# Patient Record
Sex: MALE | Race: BLACK OR AFRICAN AMERICAN
[De-identification: names, ages, dates, MRNs, and addresses within clinical notes are randomized per-mention and may not be internally consistent; named-entity substitution may affect disease eponyms.]

---

## 2022-12-13 ENCOUNTER — HOSPITAL ENCOUNTER (INPATIENT)
Dept: HOSPITAL 12 - TELE3 | Age: 42
LOS: 6 days | Discharge: HOME | DRG: 871 | End: 2022-12-19
Attending: INTERNAL MEDICINE | Admitting: INTERNAL MEDICINE
Payer: MEDICARE

## 2022-12-13 VITALS — DIASTOLIC BLOOD PRESSURE: 78 MMHG | SYSTOLIC BLOOD PRESSURE: 132 MMHG

## 2022-12-13 VITALS — DIASTOLIC BLOOD PRESSURE: 95 MMHG | SYSTOLIC BLOOD PRESSURE: 152 MMHG

## 2022-12-13 VITALS — SYSTOLIC BLOOD PRESSURE: 152 MMHG | DIASTOLIC BLOOD PRESSURE: 97 MMHG

## 2022-12-13 VITALS — WEIGHT: 125.44 LBS | BODY MASS INDEX: 21.42 KG/M2 | HEIGHT: 64 IN

## 2022-12-13 VITALS — SYSTOLIC BLOOD PRESSURE: 146 MMHG | DIASTOLIC BLOOD PRESSURE: 95 MMHG

## 2022-12-13 VITALS — SYSTOLIC BLOOD PRESSURE: 129 MMHG | DIASTOLIC BLOOD PRESSURE: 89 MMHG

## 2022-12-13 DIAGNOSIS — E03.9: ICD-10-CM
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
## 2022-12-13 DIAGNOSIS — D72.829: ICD-10-CM

## 2022-12-13 DIAGNOSIS — S01.80XA: ICD-10-CM

## 2022-12-13 DIAGNOSIS — J18.9: ICD-10-CM

## 2022-12-13 DIAGNOSIS — Z91.030: ICD-10-CM
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
## 2022-12-13 DIAGNOSIS — C44.320: ICD-10-CM

## 2022-12-13 DIAGNOSIS — J20.9: ICD-10-CM

## 2022-12-13 DIAGNOSIS — Z86.61: ICD-10-CM

## 2022-12-13 DIAGNOSIS — J90: ICD-10-CM

## 2022-12-13 DIAGNOSIS — R91.8: ICD-10-CM

## 2022-12-13 DIAGNOSIS — F41.9: ICD-10-CM

## 2022-12-13 DIAGNOSIS — R74.01: ICD-10-CM

## 2022-12-13 DIAGNOSIS — A41.9: Primary | ICD-10-CM

## 2022-12-13 DIAGNOSIS — I10: ICD-10-CM

## 2022-12-13 DIAGNOSIS — Z92.3: ICD-10-CM

## 2022-12-13 DIAGNOSIS — F29: ICD-10-CM

## 2022-12-13 DIAGNOSIS — E88.09: ICD-10-CM

## 2022-12-13 DIAGNOSIS — J98.11: ICD-10-CM

## 2022-12-13 DIAGNOSIS — G93.49: ICD-10-CM

## 2022-12-13 DIAGNOSIS — E46: ICD-10-CM

## 2022-12-13 DIAGNOSIS — D75.839: ICD-10-CM

## 2022-12-13 DIAGNOSIS — Z88.1: ICD-10-CM

## 2022-12-13 DIAGNOSIS — D64.9: ICD-10-CM

## 2022-12-13 LAB
BUN SERPL-MCNC: 22 MG/DL (ref 7–18)
CHLORIDE SERPL-SCNC: 102 MMOL/L (ref 98–107)
CO2 SERPL-SCNC: 25 MMOL/L (ref 21–32)
CREAT SERPL-MCNC: 0.7 MG/DL (ref 0.6–1.3)
GLUCOSE SERPL-MCNC: 92 MG/DL (ref 74–106)
HCT VFR BLD AUTO: 31.2 % (ref 36.7–47.1)
MCH RBC QN AUTO: 28.6 UUG (ref 23.8–33.4)
MCV RBC AUTO: 86.5 FL (ref 73–96.2)
PLATELET # BLD AUTO: 890 K/UL (ref 152–348)
POTASSIUM SERPL-SCNC: 3.8 MMOL/L (ref 3.5–5.1)

## 2022-12-13 PROCEDURE — A6213 FOAM DRG >16<=48 SQ IN W/BDR: HCPCS

## 2022-12-13 PROCEDURE — A4663 DIALYSIS BLOOD PRESSURE CUFF: HCPCS

## 2022-12-13 PROCEDURE — A6209 FOAM DRSG <=16 SQ IN W/O BDR: HCPCS

## 2022-12-13 PROCEDURE — G0378 HOSPITAL OBSERVATION PER HR: HCPCS

## 2022-12-13 RX ADMIN — ALBUTEROL SULFATE SCH MG: 1.25 SOLUTION RESPIRATORY (INHALATION) at 08:17

## 2022-12-13 RX ADMIN — DEXTROSE SCH MLS/HR: 50 INJECTION, SOLUTION INTRAVENOUS at 23:26

## 2022-12-13 RX ADMIN — DEXTROSE SCH MLS/HR: 50 INJECTION, SOLUTION INTRAVENOUS at 19:05

## 2022-12-13 RX ADMIN — IPRATROPIUM BROMIDE PRN MG: 0.5 SOLUTION RESPIRATORY (INHALATION) at 05:36

## 2022-12-13 RX ADMIN — PENTOXIFYLLINE SCH MG: 400 TABLET, EXTENDED RELEASE ORAL at 16:36

## 2022-12-13 RX ADMIN — ALBUTEROL SULFATE SCH MG: 1.25 SOLUTION RESPIRATORY (INHALATION) at 13:44

## 2022-12-13 RX ADMIN — OXYCODONE HYDROCHLORIDE AND ACETAMINOPHEN SCH MG: 500 TABLET ORAL at 16:04

## 2022-12-13 RX ADMIN — QUETIAPINE SCH MG: 25 TABLET, FILM COATED ORAL at 16:04

## 2022-12-13 RX ADMIN — MUPIROCIN SCH APPLIC: 20 OINTMENT TOPICAL at 20:35

## 2022-12-13 RX ADMIN — CHLORHEXIDINE GLUCONATE SCH ML: 1.2 RINSE ORAL at 16:36

## 2022-12-13 RX ADMIN — OLANZAPINE SCH MG: 5 TABLET ORAL at 20:34

## 2022-12-13 RX ADMIN — GUAIFENESIN AND DEXTROMETHORPHAN PRN ML: 100; 10 SYRUP ORAL at 04:47

## 2022-12-13 RX ADMIN — DEXTROSE SCH MLS/HR: 50 INJECTION, SOLUTION INTRAVENOUS at 19:00

## 2022-12-13 RX ADMIN — ALBUTEROL SULFATE SCH MG: 1.25 SOLUTION RESPIRATORY (INHALATION) at 19:34

## 2022-12-13 NOTE — NUR
ADMITTED THIS 42 Y.O. MALE AS DIRECT ADMIT FROM [facility],VIA GURNEY ,ALERT,ORIENTED 
X2-3, OXYGEN INHALATION AT 4L/MIN VIA NASAL CANNULA SATURATING 90-95%, PT IS AMOUTH 
BREATHER. HE IS NOT INANY DISTRESS.HE HAS HAD HISTORY OF MILD INTELLECTUAL DISABILITY AND 
JUST RECENTLY GOT DISCHARGED FROM [facility] AFTER BEING TREATED FOR COMMUNITY ACQUIRED 
PNA, NEOPLASM OF LOWER LIP.SQUAMOUS CELL CARCINOMA ON LOWER CHIN. SKILLED NURSING ASSESSMENT 
DONE, PHOTO OF LOWER CHIN TAKEN. HISTORY OF HYPOTHYROID,DYSPHAGIA

## 2022-12-13 NOTE — NUR
WOUND CARE CONSULT: PT PRESENTS WITH SACRAL INTACT DEEP TISSUE INJURY AS WELL AS CHIN 
LESIONS, PRESENT ON ADMISSION. SURGICAL CONSULT CALLED TO DR LOPEZ. RECOMMENDATIONS 
MADE FOR SKIN PROTECTION. DISCUSSED WITH NURSING STAFF. CHIN WOUNDS/LESIONS COVERED WITH 
BORDERGAUZE AT THIS TIME. FAMILY MEMBER AT BEDSIDE. MD IN AGREEMENT WITH PLAN OF CARE.

## 2022-12-13 NOTE — NUR
Received patient resting in bed with sister by the bedside. AAOx4. No signs of distress 
noted at this time. On 2L O2 NC. Is SR on tele monitor, HR 98. Right FA IV access is intact 
and patent. Is ambulatory and complains of no pain. Concerns were communicated and resolved 
at this time. Endorsed safety and comfort measures.

## 2022-12-13 NOTE — NUR
Pt. noted to be stable during the shift. Alert and oriented x4. No acute distress noted. 
Able to got to the bathroom with assist. No c/o pain. All need attended and met. Able to 
make the need known. Compliance with the care given. Will continue monitoring the patient.

## 2022-12-13 NOTE — NUR
ATB MAXEFIME GIVEN AS ORDERED AND PT TOLERATED IT WELL,RESTED FAIRLY WELL, WITH OCCASSIONAL 
BOUTS OF NON PRODUCTIVE COUGH, ROBITUSSIN GIVEN.CT CHEST SHOWED LUNG MASS.COVID .NEGATIVE.

## 2022-12-13 NOTE — NUR
IV SITE ON LEFT AC INFILTRATED, NEW IV G20 INSERTED ON RIGHT FOREARMBREATHING TREATMENTS 
ADMINISTERED, ,AUGIE COHEN NP NOTIFIED AND OBTAIED ADMISSION ORDERS, PT ABLE TO SWALLOW 
USING SMALL STRAW.

## 2022-12-14 VITALS — SYSTOLIC BLOOD PRESSURE: 153 MMHG | DIASTOLIC BLOOD PRESSURE: 105 MMHG

## 2022-12-14 VITALS — SYSTOLIC BLOOD PRESSURE: 168 MMHG | DIASTOLIC BLOOD PRESSURE: 105 MMHG

## 2022-12-14 VITALS — DIASTOLIC BLOOD PRESSURE: 93 MMHG | SYSTOLIC BLOOD PRESSURE: 128 MMHG

## 2022-12-14 VITALS — DIASTOLIC BLOOD PRESSURE: 124 MMHG | SYSTOLIC BLOOD PRESSURE: 175 MMHG

## 2022-12-14 VITALS — SYSTOLIC BLOOD PRESSURE: 148 MMHG | DIASTOLIC BLOOD PRESSURE: 92 MMHG

## 2022-12-14 VITALS — DIASTOLIC BLOOD PRESSURE: 101 MMHG | SYSTOLIC BLOOD PRESSURE: 144 MMHG

## 2022-12-14 LAB
ALP SERPL-CCNC: 257 U/L (ref 50–136)
ALT SERPL W/O P-5'-P-CCNC: 180 U/L (ref 16–63)
AST SERPL-CCNC: 161 U/L (ref 15–37)
BILIRUB SERPL-MCNC: 0.5 MG/DL (ref 0.2–1)
BUN SERPL-MCNC: 17 MG/DL (ref 7–18)
CHLORIDE SERPL-SCNC: 101 MMOL/L (ref 98–107)
CO2 SERPL-SCNC: 28 MMOL/L (ref 21–32)
CREAT SERPL-MCNC: 0.8 MG/DL (ref 0.6–1.3)
GLUCOSE SERPL-MCNC: 83 MG/DL (ref 74–106)
HCT VFR BLD AUTO: 35.2 % (ref 36.7–47.1)
IRON SERPL-MCNC: 27 UG/DL (ref 50–175)
LDH SERPL L TO P-CCNC: 175 U/L (ref 85–227)
MAGNESIUM SERPL-MCNC: 2.4 MG/DL (ref 1.8–2.4)
MCH RBC QN AUTO: 28.7 UUG (ref 23.8–33.4)
MCV RBC AUTO: 86.3 FL (ref 73–96.2)
PHOSPHATE SERPL-MCNC: 3.9 MG/DL (ref 2.5–4.9)
PLATELET # BLD AUTO: 973 K/UL (ref 152–348)
POTASSIUM SERPL-SCNC: 3.8 MMOL/L (ref 3.5–5.1)
TSH SERPL DL<=0.005 MIU/L-ACNC: 13.5 MIU/ML (ref 0.36–3.74)
WS STN SPEC: 9.5 G/DL (ref 6.4–8.2)

## 2022-12-14 RX ADMIN — PENTOXIFYLLINE SCH MG: 400 TABLET, EXTENDED RELEASE ORAL at 09:13

## 2022-12-14 RX ADMIN — ALBUTEROL SULFATE SCH MG: 1.25 SOLUTION RESPIRATORY (INHALATION) at 00:38

## 2022-12-14 RX ADMIN — OLANZAPINE SCH MG: 5 TABLET ORAL at 21:03

## 2022-12-14 RX ADMIN — MUPIROCIN SCH APPLIC: 20 OINTMENT TOPICAL at 21:15

## 2022-12-14 RX ADMIN — DEXTROSE SCH MLS/HR: 50 INJECTION, SOLUTION INTRAVENOUS at 13:25

## 2022-12-14 RX ADMIN — OXYCODONE HYDROCHLORIDE AND ACETAMINOPHEN SCH MG: 500 TABLET ORAL at 18:02

## 2022-12-14 RX ADMIN — Medication SCH MG: at 21:14

## 2022-12-14 RX ADMIN — ALBUTEROL SULFATE SCH MG: 1.25 SOLUTION RESPIRATORY (INHALATION) at 14:10

## 2022-12-14 RX ADMIN — ALBUTEROL SULFATE SCH MG: 1.25 SOLUTION RESPIRATORY (INHALATION) at 07:41

## 2022-12-14 RX ADMIN — MUPIROCIN SCH APPLIC: 20 OINTMENT TOPICAL at 09:12

## 2022-12-14 RX ADMIN — DEXTROSE SCH MLS/HR: 50 INJECTION, SOLUTION INTRAVENOUS at 06:32

## 2022-12-14 RX ADMIN — CHLORHEXIDINE GLUCONATE SCH ML: 1.2 RINSE ORAL at 06:02

## 2022-12-14 RX ADMIN — OLANZAPINE SCH MG: 5 TABLET ORAL at 09:13

## 2022-12-14 RX ADMIN — CITALOPRAM HYDROBROMIDE SCH MG: 20 TABLET, FILM COATED ORAL at 09:14

## 2022-12-14 RX ADMIN — CHLORHEXIDINE GLUCONATE SCH ML: 1.2 RINSE ORAL at 21:15

## 2022-12-14 RX ADMIN — OXYCODONE HYDROCHLORIDE AND ACETAMINOPHEN SCH MG: 500 TABLET ORAL at 09:14

## 2022-12-14 RX ADMIN — Medication SCH MG: at 13:24

## 2022-12-14 RX ADMIN — QUETIAPINE SCH MG: 25 TABLET, FILM COATED ORAL at 09:14

## 2022-12-14 RX ADMIN — DEXTROSE SCH MLS/HR: 50 INJECTION, SOLUTION INTRAVENOUS at 21:38

## 2022-12-14 RX ADMIN — CHLORHEXIDINE GLUCONATE SCH ML: 1.2 RINSE ORAL at 18:03

## 2022-12-14 RX ADMIN — PENTOXIFYLLINE SCH MG: 400 TABLET, EXTENDED RELEASE ORAL at 13:23

## 2022-12-14 RX ADMIN — QUETIAPINE SCH MG: 25 TABLET, FILM COATED ORAL at 18:02

## 2022-12-14 RX ADMIN — Medication SCH UNITS: at 09:13

## 2022-12-14 RX ADMIN — ALBUTEROL SULFATE SCH MG: 1.25 SOLUTION RESPIRATORY (INHALATION) at 19:53

## 2022-12-14 RX ADMIN — PENTOXIFYLLINE SCH MG: 400 TABLET, EXTENDED RELEASE ORAL at 18:02

## 2022-12-14 NOTE — NUR
Patient's blood pressure was noted to be elevated 168/105, HR 96, SR on tele monitor. On 2L 
NC, 96% O2 sat. No distress noted at this time. Is able to fall asleep with adequate rest 
comfortably. Denies any pain. Notified Dr. Bingham on current situation. No additional orders 
provided. Will continue to monitor.

## 2022-12-14 NOTE — NUR
Received report from ANDRES Whitehead, NOC shift. Patient is alert and oriented x3-4, and understands 
English. Upon speaking, patient is unable to communicate clearly, but able to make needs 
known via garble speech and arm gestures. 



Upon morning vital signs, RN noted high blood pressure 153/105. RN notified Dr. Obey MD 
regarding the elevation of blood pressure consistent with the night shift from report. MD 
ordered Metoprol 25mg PO Q12H for blood pressure. Upon administration, patient's blood 
pressure for 1600 had reduce to 128/93. Other vital signs within normal limits of baseline. 
Per report, patient had been on 1L NC. RN noted without NC, patient is saturating 98% on RA. 
Patient reports no SOB. 



Patient tolerates PO medications and puree diet well. IV antibiotics given as ordered. 
Patient voids adequate. Tele monitor indicates sinus rhythm. 



Patient seen ambulating around the unit with standby assist. Patient is steady. Patient 
expressed he enjoyed the walk to stretch his legs. Throughout the day, RN changed chin 
dressing x3 due to soiling. RN followed MD orders for wound treatment as indicated. Dressing 
is clean, dry and intact. Universal precautions observed. Patient has family member, Jamari, 
at bedside. No acute distress noted. Endorsed continuation of care to ANDRES Whitehead.

## 2022-12-14 NOTE — NUR
Received patient laying in bed comfortably with the sister and dad by bedside. AAOx4. No 
complaints of pain, SOB at this time. Is SR on tele monitor, HR 87. Right FA IV access is 
intact and patent. Safety and comfort measures are enforced. All concerns were addressed and 
were verbally understood by family members.

## 2022-12-14 NOTE — NUR
Patient slept intermittently throughout the night. No reaction to antibiotics were noted. No 
signs of distress or complains of pain. Is SR on tele monitor, HR 96. Blood pressure is 
still elevated, will endorse. All needs were attended to and met. Maintained safety and 
comfort measures.

## 2022-12-15 VITALS — DIASTOLIC BLOOD PRESSURE: 85 MMHG | SYSTOLIC BLOOD PRESSURE: 125 MMHG

## 2022-12-15 VITALS — DIASTOLIC BLOOD PRESSURE: 81 MMHG | SYSTOLIC BLOOD PRESSURE: 131 MMHG

## 2022-12-15 VITALS — DIASTOLIC BLOOD PRESSURE: 89 MMHG | SYSTOLIC BLOOD PRESSURE: 124 MMHG

## 2022-12-15 VITALS — DIASTOLIC BLOOD PRESSURE: 102 MMHG | SYSTOLIC BLOOD PRESSURE: 148 MMHG

## 2022-12-15 VITALS — SYSTOLIC BLOOD PRESSURE: 141 MMHG | DIASTOLIC BLOOD PRESSURE: 97 MMHG

## 2022-12-15 LAB
ALP SERPL-CCNC: 233 U/L (ref 50–136)
ALT SERPL W/O P-5'-P-CCNC: 144 U/L (ref 16–63)
AST SERPL-CCNC: 103 U/L (ref 15–37)
BASE EXCESS BLDA CALC-SCNC: 2.9 MMOL/L
BILIRUB SERPL-MCNC: 0.3 MG/DL (ref 0.2–1)
BUN SERPL-MCNC: 23 MG/DL (ref 7–18)
CHLORIDE SERPL-SCNC: 103 MMOL/L (ref 98–107)
CO2 SERPL-SCNC: 28 MMOL/L (ref 21–32)
CREAT SERPL-MCNC: 0.8 MG/DL (ref 0.6–1.3)
GLUCOSE SERPL-MCNC: 95 MG/DL (ref 74–106)
HCO3 BLDA-SCNC: 26.5 MMOL/L
HCT VFR BLD AUTO: 35.2 % (ref 36.7–47.1)
HGB BLDA OXIMETRY-MCNC: 13 G/DL (ref 13.5–18)
INHALED O2 CONCENTRATION: 21 %
MAGNESIUM SERPL-MCNC: 2.4 MG/DL (ref 1.8–2.4)
MCH RBC QN AUTO: 28.4 UUG (ref 23.8–33.4)
MCV RBC AUTO: 86.6 FL (ref 73–96.2)
PCO2 TEMP ADJ BLDA: 37.4 MMHG (ref 35–45)
PH TEMP ADJ BLDA: 7.47 [PH] (ref 7.35–7.45)
PHOSPHATE SERPL-MCNC: 4.2 MG/DL (ref 2.5–4.9)
PLATELET # BLD AUTO: 983 K/UL (ref 152–348)
PO2 TEMP ADJ BLDA: 71.5 MMHG (ref 75–100)
POTASSIUM SERPL-SCNC: 4.4 MMOL/L (ref 3.5–5.1)
SPECIMEN DRAWN FROM PATIENT: (no result)
VENTILATION MODE VENT: (no result)
WS STN SPEC: 9.5 G/DL (ref 6.4–8.2)

## 2022-12-15 RX ADMIN — CHLORHEXIDINE GLUCONATE SCH ML: 1.2 RINSE ORAL at 21:26

## 2022-12-15 RX ADMIN — OXYCODONE HYDROCHLORIDE AND ACETAMINOPHEN SCH MG: 500 TABLET ORAL at 09:32

## 2022-12-15 RX ADMIN — ALBUTEROL SULFATE SCH MG: 1.25 SOLUTION RESPIRATORY (INHALATION) at 01:07

## 2022-12-15 RX ADMIN — Medication SCH MG: at 21:42

## 2022-12-15 RX ADMIN — MUPIROCIN SCH APPLIC: 20 OINTMENT TOPICAL at 09:44

## 2022-12-15 RX ADMIN — ASPIRIN SCH MG: 81 TABLET, COATED ORAL at 15:51

## 2022-12-15 RX ADMIN — PENTOXIFYLLINE SCH MG: 400 TABLET, EXTENDED RELEASE ORAL at 13:18

## 2022-12-15 RX ADMIN — ALBUTEROL SULFATE SCH MG: 1.25 SOLUTION RESPIRATORY (INHALATION) at 15:05

## 2022-12-15 RX ADMIN — QUETIAPINE SCH MG: 25 TABLET, FILM COATED ORAL at 16:24

## 2022-12-15 RX ADMIN — DEXTROSE SCH MLS/HR: 50 INJECTION, SOLUTION INTRAVENOUS at 06:09

## 2022-12-15 RX ADMIN — LEVOTHYROXINE SODIUM SCH MCG: 100 TABLET ORAL at 06:09

## 2022-12-15 RX ADMIN — Medication SCH MG: at 09:38

## 2022-12-15 RX ADMIN — CHLORHEXIDINE GLUCONATE SCH ML: 1.2 RINSE ORAL at 09:43

## 2022-12-15 RX ADMIN — PENTOXIFYLLINE SCH MG: 400 TABLET, EXTENDED RELEASE ORAL at 16:24

## 2022-12-15 RX ADMIN — DEXTROSE SCH MLS/HR: 50 INJECTION, SOLUTION INTRAVENOUS at 14:36

## 2022-12-15 RX ADMIN — GUAIFENESIN AND DEXTROMETHORPHAN PRN ML: 100; 10 SYRUP ORAL at 22:26

## 2022-12-15 RX ADMIN — DEXTROSE SCH MLS/HR: 50 INJECTION, SOLUTION INTRAVENOUS at 21:37

## 2022-12-15 RX ADMIN — OLANZAPINE SCH MG: 5 TABLET ORAL at 09:32

## 2022-12-15 RX ADMIN — PENTOXIFYLLINE SCH MG: 400 TABLET, EXTENDED RELEASE ORAL at 09:43

## 2022-12-15 RX ADMIN — OXYCODONE HYDROCHLORIDE AND ACETAMINOPHEN SCH MG: 500 TABLET ORAL at 16:24

## 2022-12-15 RX ADMIN — Medication SCH UNITS: at 09:44

## 2022-12-15 RX ADMIN — MUPIROCIN SCH APPLIC: 20 OINTMENT TOPICAL at 21:37

## 2022-12-15 RX ADMIN — CITALOPRAM HYDROBROMIDE SCH MG: 20 TABLET, FILM COATED ORAL at 09:46

## 2022-12-15 RX ADMIN — ALBUTEROL SULFATE SCH MG: 1.25 SOLUTION RESPIRATORY (INHALATION) at 08:50

## 2022-12-15 RX ADMIN — QUETIAPINE SCH MG: 25 TABLET, FILM COATED ORAL at 09:33

## 2022-12-15 RX ADMIN — ALBUTEROL SULFATE SCH MG: 1.25 SOLUTION RESPIRATORY (INHALATION) at 20:56

## 2022-12-15 RX ADMIN — OLANZAPINE SCH MG: 5 TABLET ORAL at 21:42

## 2022-12-15 NOTE — NUR
Patient slept comfortably throughout the night. No complaints of pain or SOB was noted at 
this time. SR on tele monitor, HR 83. No adverse reaction was noted for administered 
antibiotics. Safety and comfort measures were maintained. All needs were attended to and 
met.

## 2022-12-16 VITALS — DIASTOLIC BLOOD PRESSURE: 101 MMHG | SYSTOLIC BLOOD PRESSURE: 175 MMHG

## 2022-12-16 VITALS — SYSTOLIC BLOOD PRESSURE: 130 MMHG | DIASTOLIC BLOOD PRESSURE: 93 MMHG

## 2022-12-16 VITALS — SYSTOLIC BLOOD PRESSURE: 109 MMHG | DIASTOLIC BLOOD PRESSURE: 77 MMHG

## 2022-12-16 VITALS — DIASTOLIC BLOOD PRESSURE: 72 MMHG | SYSTOLIC BLOOD PRESSURE: 103 MMHG

## 2022-12-16 RX ADMIN — QUETIAPINE SCH MG: 25 TABLET, FILM COATED ORAL at 16:40

## 2022-12-16 RX ADMIN — CHLORHEXIDINE GLUCONATE SCH ML: 1.2 RINSE ORAL at 09:30

## 2022-12-16 RX ADMIN — CHLORHEXIDINE GLUCONATE SCH ML: 1.2 RINSE ORAL at 21:13

## 2022-12-16 RX ADMIN — PENTOXIFYLLINE SCH MG: 400 TABLET, EXTENDED RELEASE ORAL at 16:41

## 2022-12-16 RX ADMIN — OXYCODONE HYDROCHLORIDE AND ACETAMINOPHEN SCH MG: 500 TABLET ORAL at 09:31

## 2022-12-16 RX ADMIN — MUPIROCIN SCH APPLIC: 20 OINTMENT TOPICAL at 09:31

## 2022-12-16 RX ADMIN — Medication SCH MG: at 21:13

## 2022-12-16 RX ADMIN — ALBUTEROL SULFATE SCH MG: 1.25 SOLUTION RESPIRATORY (INHALATION) at 00:30

## 2022-12-16 RX ADMIN — DEXTROSE SCH MLS/HR: 50 INJECTION, SOLUTION INTRAVENOUS at 13:38

## 2022-12-16 RX ADMIN — DEXTROSE SCH MLS/HR: 50 INJECTION, SOLUTION INTRAVENOUS at 21:11

## 2022-12-16 RX ADMIN — GUAIFENESIN AND DEXTROMETHORPHAN PRN ML: 100; 10 SYRUP ORAL at 21:11

## 2022-12-16 RX ADMIN — PENTOXIFYLLINE SCH MG: 400 TABLET, EXTENDED RELEASE ORAL at 13:16

## 2022-12-16 RX ADMIN — ALBUTEROL SULFATE SCH MG: 1.25 SOLUTION RESPIRATORY (INHALATION) at 20:20

## 2022-12-16 RX ADMIN — ALBUTEROL SULFATE SCH MG: 1.25 SOLUTION RESPIRATORY (INHALATION) at 08:42

## 2022-12-16 RX ADMIN — OLANZAPINE SCH MG: 5 TABLET ORAL at 09:32

## 2022-12-16 RX ADMIN — SODIUM CHLORIDE PRN MLS/HR: 0.9 INJECTION, SOLUTION INTRAVENOUS at 02:13

## 2022-12-16 RX ADMIN — LEVOTHYROXINE SODIUM SCH MCG: 100 TABLET ORAL at 06:04

## 2022-12-16 RX ADMIN — ASPIRIN SCH MG: 81 TABLET, COATED ORAL at 09:31

## 2022-12-16 RX ADMIN — Medication SCH MG: at 10:16

## 2022-12-16 RX ADMIN — DEXTROSE SCH MLS/HR: 50 INJECTION, SOLUTION INTRAVENOUS at 05:28

## 2022-12-16 RX ADMIN — ALBUTEROL SULFATE SCH MG: 1.25 SOLUTION RESPIRATORY (INHALATION) at 13:46

## 2022-12-16 RX ADMIN — PENTOXIFYLLINE SCH MG: 400 TABLET, EXTENDED RELEASE ORAL at 09:31

## 2022-12-16 RX ADMIN — MUPIROCIN SCH APPLIC: 20 OINTMENT TOPICAL at 21:17

## 2022-12-16 RX ADMIN — CITALOPRAM HYDROBROMIDE SCH MG: 20 TABLET, FILM COATED ORAL at 09:31

## 2022-12-16 RX ADMIN — QUETIAPINE SCH MG: 25 TABLET, FILM COATED ORAL at 09:32

## 2022-12-16 RX ADMIN — Medication SCH UNITS: at 09:30

## 2022-12-16 RX ADMIN — OLANZAPINE SCH MG: 5 TABLET ORAL at 21:12

## 2022-12-16 RX ADMIN — OXYCODONE HYDROCHLORIDE AND ACETAMINOPHEN SCH MG: 500 TABLET ORAL at 16:41

## 2022-12-16 NOTE — NUR
Patient has been NPO since midnight, he just got picked up for CT scan of chest, abdomen and 
pelvis by radiology.Consent form signed by the patient. His father is at bedside, went 
downstairs with them.

## 2022-12-16 NOTE — NUR
Pt stable. Meddto MR reBP elevated no prn med. Ambulate to BR [facility] assistance. 1:1 Sitter at 
bedside.

## 2022-12-17 VITALS — SYSTOLIC BLOOD PRESSURE: 154 MMHG | DIASTOLIC BLOOD PRESSURE: 94 MMHG

## 2022-12-17 VITALS — SYSTOLIC BLOOD PRESSURE: 110 MMHG | DIASTOLIC BLOOD PRESSURE: 78 MMHG

## 2022-12-17 LAB
HCT VFR BLD AUTO: 35.9 % (ref 36.7–47.1)
IGG SERPL-MCNC: 3067 MG/DL (ref 603–1613)
MCH RBC QN AUTO: 28.3 UUG (ref 23.8–33.4)
MCV RBC AUTO: 86.4 FL (ref 73–96.2)
PLATELET # BLD AUTO: 836 K/UL (ref 152–348)

## 2022-12-17 RX ADMIN — OXYCODONE HYDROCHLORIDE AND ACETAMINOPHEN SCH MG: 500 TABLET ORAL at 08:20

## 2022-12-17 RX ADMIN — MUPIROCIN SCH APPLIC: 20 OINTMENT TOPICAL at 21:00

## 2022-12-17 RX ADMIN — CHLORHEXIDINE GLUCONATE SCH ML: 1.2 RINSE ORAL at 08:19

## 2022-12-17 RX ADMIN — ASPIRIN SCH MG: 81 TABLET, COATED ORAL at 08:20

## 2022-12-17 RX ADMIN — LEVOTHYROXINE SODIUM SCH MCG: 100 TABLET ORAL at 06:02

## 2022-12-17 RX ADMIN — DEXTROSE SCH MLS/HR: 50 INJECTION, SOLUTION INTRAVENOUS at 05:26

## 2022-12-17 RX ADMIN — CITALOPRAM HYDROBROMIDE SCH MG: 20 TABLET, FILM COATED ORAL at 08:20

## 2022-12-17 RX ADMIN — DEXTROSE SCH MLS/HR: 50 INJECTION, SOLUTION INTRAVENOUS at 15:14

## 2022-12-17 RX ADMIN — ALBUTEROL SULFATE SCH MG: 1.25 SOLUTION RESPIRATORY (INHALATION) at 08:45

## 2022-12-17 RX ADMIN — Medication SCH UNITS: at 08:20

## 2022-12-17 RX ADMIN — ALBUTEROL SULFATE SCH MG: 1.25 SOLUTION RESPIRATORY (INHALATION) at 14:20

## 2022-12-17 RX ADMIN — GUAIFENESIN AND DEXTROMETHORPHAN PRN ML: 100; 10 SYRUP ORAL at 21:02

## 2022-12-17 RX ADMIN — ALBUTEROL SULFATE SCH MG: 1.25 SOLUTION RESPIRATORY (INHALATION) at 01:30

## 2022-12-17 RX ADMIN — Medication SCH MG: at 21:02

## 2022-12-17 RX ADMIN — PENTOXIFYLLINE SCH MG: 400 TABLET, EXTENDED RELEASE ORAL at 15:14

## 2022-12-17 RX ADMIN — Medication SCH MG: at 08:33

## 2022-12-17 RX ADMIN — OXYCODONE HYDROCHLORIDE AND ACETAMINOPHEN SCH MG: 500 TABLET ORAL at 17:06

## 2022-12-17 RX ADMIN — ALBUTEROL SULFATE SCH MG: 1.25 SOLUTION RESPIRATORY (INHALATION) at 18:46

## 2022-12-17 RX ADMIN — PENTOXIFYLLINE SCH MG: 400 TABLET, EXTENDED RELEASE ORAL at 17:06

## 2022-12-17 RX ADMIN — Medication SCH MG: at 08:34

## 2022-12-17 RX ADMIN — PENTOXIFYLLINE SCH MG: 400 TABLET, EXTENDED RELEASE ORAL at 08:20

## 2022-12-17 RX ADMIN — CHLORHEXIDINE GLUCONATE SCH ML: 1.2 RINSE ORAL at 21:06

## 2022-12-17 RX ADMIN — SODIUM CHLORIDE PRN MLS/HR: 0.9 INJECTION, SOLUTION INTRAVENOUS at 06:09

## 2022-12-17 RX ADMIN — DEXTROSE SCH MLS/HR: 50 INJECTION, SOLUTION INTRAVENOUS at 22:01

## 2022-12-17 RX ADMIN — OLANZAPINE SCH MG: 5 TABLET ORAL at 21:03

## 2022-12-17 RX ADMIN — OLANZAPINE SCH MG: 5 TABLET ORAL at 08:20

## 2022-12-17 RX ADMIN — QUETIAPINE SCH MG: 25 TABLET, FILM COATED ORAL at 08:20

## 2022-12-17 RX ADMIN — MUPIROCIN SCH APPLIC: 20 OINTMENT TOPICAL at 08:19

## 2022-12-17 RX ADMIN — QUETIAPINE SCH MG: 25 TABLET, FILM COATED ORAL at 17:06

## 2022-12-17 NOTE — NUR
Patient requested to take a shower. His father at bedside. His father assisted him with the 
shower. Patient appears happy.

## 2022-12-18 VITALS — SYSTOLIC BLOOD PRESSURE: 130 MMHG | DIASTOLIC BLOOD PRESSURE: 89 MMHG

## 2022-12-18 VITALS — DIASTOLIC BLOOD PRESSURE: 83 MMHG | SYSTOLIC BLOOD PRESSURE: 114 MMHG

## 2022-12-18 VITALS — DIASTOLIC BLOOD PRESSURE: 67 MMHG | SYSTOLIC BLOOD PRESSURE: 101 MMHG

## 2022-12-18 LAB
ALP SERPL-CCNC: 181 U/L (ref 50–136)
ALT SERPL W/O P-5'-P-CCNC: 125 U/L (ref 16–63)
AST SERPL-CCNC: 76 U/L (ref 15–37)
BILIRUB SERPL-MCNC: 0.5 MG/DL (ref 0.2–1)
BUN SERPL-MCNC: 18 MG/DL (ref 7–18)
CHLORIDE SERPL-SCNC: 100 MMOL/L (ref 98–107)
CO2 SERPL-SCNC: 30 MMOL/L (ref 21–32)
CREAT SERPL-MCNC: 0.7 MG/DL (ref 0.6–1.3)
GLUCOSE SERPL-MCNC: 91 MG/DL (ref 74–106)
HCT VFR BLD AUTO: 38 % (ref 36.7–47.1)
MAGNESIUM SERPL-MCNC: 2.4 MG/DL (ref 1.8–2.4)
MCH RBC QN AUTO: 27.9 UUG (ref 23.8–33.4)
MCV RBC AUTO: 86.5 FL (ref 73–96.2)
PHOSPHATE SERPL-MCNC: 3.4 MG/DL (ref 2.5–4.9)
PLATELET # BLD AUTO: 850 K/UL (ref 152–348)
POTASSIUM SERPL-SCNC: 4.1 MMOL/L (ref 3.5–5.1)
WS STN SPEC: 9.3 G/DL (ref 6.4–8.2)

## 2022-12-18 RX ADMIN — DEXTROSE SCH MLS/HR: 50 INJECTION, SOLUTION INTRAVENOUS at 22:00

## 2022-12-18 RX ADMIN — ALBUTEROL SULFATE SCH MG: 1.25 SOLUTION RESPIRATORY (INHALATION) at 00:22

## 2022-12-18 RX ADMIN — Medication SCH UNITS: at 08:12

## 2022-12-18 RX ADMIN — CHLORHEXIDINE GLUCONATE SCH ML: 1.2 RINSE ORAL at 08:12

## 2022-12-18 RX ADMIN — ALBUTEROL SULFATE SCH MG: 1.25 SOLUTION RESPIRATORY (INHALATION) at 09:14

## 2022-12-18 RX ADMIN — Medication SCH MG: at 08:12

## 2022-12-18 RX ADMIN — QUETIAPINE SCH MG: 25 TABLET, FILM COATED ORAL at 08:12

## 2022-12-18 RX ADMIN — Medication SCH MG: at 20:49

## 2022-12-18 RX ADMIN — IPRATROPIUM BROMIDE PRN MG: 0.5 SOLUTION RESPIRATORY (INHALATION) at 09:15

## 2022-12-18 RX ADMIN — PENTOXIFYLLINE SCH MG: 400 TABLET, EXTENDED RELEASE ORAL at 10:11

## 2022-12-18 RX ADMIN — CHLORHEXIDINE GLUCONATE SCH ML: 1.2 RINSE ORAL at 21:00

## 2022-12-18 RX ADMIN — GUAIFENESIN AND DEXTROMETHORPHAN PRN ML: 100; 10 SYRUP ORAL at 09:02

## 2022-12-18 RX ADMIN — PENTOXIFYLLINE SCH MG: 400 TABLET, EXTENDED RELEASE ORAL at 16:49

## 2022-12-18 RX ADMIN — ALBUTEROL SULFATE SCH MG: 1.25 SOLUTION RESPIRATORY (INHALATION) at 14:05

## 2022-12-18 RX ADMIN — OXYCODONE HYDROCHLORIDE AND ACETAMINOPHEN SCH MG: 500 TABLET ORAL at 08:12

## 2022-12-18 RX ADMIN — QUETIAPINE SCH MG: 25 TABLET, FILM COATED ORAL at 16:49

## 2022-12-18 RX ADMIN — OXYCODONE HYDROCHLORIDE AND ACETAMINOPHEN SCH MG: 500 TABLET ORAL at 16:49

## 2022-12-18 RX ADMIN — OLANZAPINE SCH MG: 5 TABLET ORAL at 20:54

## 2022-12-18 RX ADMIN — DEXTROSE SCH MLS/HR: 50 INJECTION, SOLUTION INTRAVENOUS at 06:00

## 2022-12-18 RX ADMIN — CITALOPRAM HYDROBROMIDE SCH MG: 20 TABLET, FILM COATED ORAL at 08:12

## 2022-12-18 RX ADMIN — PENTOXIFYLLINE SCH MG: 400 TABLET, EXTENDED RELEASE ORAL at 12:46

## 2022-12-18 RX ADMIN — LEVOTHYROXINE SODIUM SCH MCG: 100 TABLET ORAL at 06:00

## 2022-12-18 RX ADMIN — MUPIROCIN SCH APPLIC: 20 OINTMENT TOPICAL at 20:47

## 2022-12-18 RX ADMIN — OLANZAPINE SCH MG: 5 TABLET ORAL at 08:12

## 2022-12-18 RX ADMIN — MUPIROCIN SCH APPLIC: 20 OINTMENT TOPICAL at 10:49

## 2022-12-18 RX ADMIN — ASPIRIN SCH MG: 81 TABLET, COATED ORAL at 08:12

## 2022-12-18 RX ADMIN — ALBUTEROL SULFATE SCH MG: 1.25 SOLUTION RESPIRATORY (INHALATION) at 21:00

## 2022-12-19 VITALS — SYSTOLIC BLOOD PRESSURE: 105 MMHG | DIASTOLIC BLOOD PRESSURE: 80 MMHG

## 2022-12-19 VITALS — SYSTOLIC BLOOD PRESSURE: 105 MMHG | DIASTOLIC BLOOD PRESSURE: 75 MMHG

## 2022-12-19 VITALS — SYSTOLIC BLOOD PRESSURE: 133 MMHG | DIASTOLIC BLOOD PRESSURE: 95 MMHG

## 2022-12-19 LAB
ALBUMIN SERPL ELPH-MCNC: 3 G/DL (ref 2.9–4.4)
ALBUMIN/GLOB SERPL: 0.5 {RATIO} (ref 0.7–1.7)
ALPHA1 GLOB SERPL ELPH-MCNC: 0.4 G/DL (ref 0–0.4)
ALPHA2 GLOB SERPL ELPH-MCNC: 1.1 G/DL (ref 0.4–1)
B-GLOBULIN SERPL ELPH-MCNC: 1.3 G/DL (ref 0.7–1.3)
BUN SERPL-MCNC: 19 MG/DL (ref 7–18)
CHLORIDE SERPL-SCNC: 99 MMOL/L (ref 98–107)
CO2 SERPL-SCNC: 32 MMOL/L (ref 21–32)
CREAT SERPL-MCNC: 0.8 MG/DL (ref 0.6–1.3)
GAMMA GLOB SERPL ELPH-MCNC: 2.9 G/DL (ref 0.4–1.8)
GLOBULIN SER CALC-MCNC: 5.7 G/DL (ref 2.2–3.9)
GLUCOSE SERPL-MCNC: 95 MG/DL (ref 74–106)
HCT VFR BLD AUTO: 36.2 % (ref 36.7–47.1)
MCH RBC QN AUTO: 28.4 UUG (ref 23.8–33.4)
MCV RBC AUTO: 86.8 FL (ref 73–96.2)
PLATELET # BLD AUTO: 765 K/UL (ref 152–348)
POTASSIUM SERPL-SCNC: 4 MMOL/L (ref 3.5–5.1)

## 2022-12-19 RX ADMIN — CITALOPRAM HYDROBROMIDE SCH MG: 20 TABLET, FILM COATED ORAL at 08:20

## 2022-12-19 RX ADMIN — DEXTROSE SCH MLS/HR: 50 INJECTION, SOLUTION INTRAVENOUS at 06:00

## 2022-12-19 RX ADMIN — Medication SCH MG: at 08:20

## 2022-12-19 RX ADMIN — OXYCODONE HYDROCHLORIDE AND ACETAMINOPHEN SCH MG: 500 TABLET ORAL at 08:20

## 2022-12-19 RX ADMIN — QUETIAPINE SCH MG: 25 TABLET, FILM COATED ORAL at 08:20

## 2022-12-19 RX ADMIN — PENTOXIFYLLINE SCH MG: 400 TABLET, EXTENDED RELEASE ORAL at 13:23

## 2022-12-19 RX ADMIN — ALBUTEROL SULFATE SCH MG: 1.25 SOLUTION RESPIRATORY (INHALATION) at 07:35

## 2022-12-19 RX ADMIN — LEVOTHYROXINE SODIUM SCH MCG: 100 TABLET ORAL at 06:27

## 2022-12-19 RX ADMIN — Medication SCH UNITS: at 08:20

## 2022-12-19 RX ADMIN — ALBUTEROL SULFATE SCH MG: 1.25 SOLUTION RESPIRATORY (INHALATION) at 01:03

## 2022-12-19 RX ADMIN — PENTOXIFYLLINE SCH MG: 400 TABLET, EXTENDED RELEASE ORAL at 08:20

## 2022-12-19 RX ADMIN — ALBUTEROL SULFATE SCH MG: 1.25 SOLUTION RESPIRATORY (INHALATION) at 12:49

## 2022-12-19 RX ADMIN — OLANZAPINE SCH MG: 5 TABLET ORAL at 08:20

## 2022-12-19 RX ADMIN — MUPIROCIN SCH APPLIC: 20 OINTMENT TOPICAL at 09:02

## 2022-12-19 RX ADMIN — CHLORHEXIDINE GLUCONATE SCH ML: 1.2 RINSE ORAL at 09:02

## 2022-12-19 NOTE — NUR
dc orders received noted and carried out.dc instruction and education given to pt father 
.fax the dc paper to group home.pt left the facility via private car in stable condition

## 2022-12-19 NOTE — NUR
pt with sitter at bedside iv out attempted to restart by charge nurse and additional nurse 
on the unit but patient ran around in corridor and refused to have iiv reinserted. Teaching 
done as to the importance of having the essence reinserted but he was adamant that he did 
not want it done. infusion held until patient is compliant.

## 2022-12-20 LAB — IGM SERPL-MCNC: 97 MG/DL (ref 20–172)

## 2023-03-29 ENCOUNTER — HOSPITAL ENCOUNTER (INPATIENT)
Dept: HOSPITAL 54 - TELE | Age: 43
LOS: 5 days | Discharge: NURSING, REHAB OR LONG-TERM CARE | DRG: 871 | End: 2023-04-03
Attending: INTERNAL MEDICINE | Admitting: NURSE PRACTITIONER
Payer: MEDICARE

## 2023-03-29 VITALS — SYSTOLIC BLOOD PRESSURE: 128 MMHG | DIASTOLIC BLOOD PRESSURE: 85 MMHG

## 2023-03-29 VITALS — WEIGHT: 147 LBS | BODY MASS INDEX: 23.63 KG/M2 | HEIGHT: 66 IN

## 2023-03-29 DIAGNOSIS — Z79.51: ICD-10-CM

## 2023-03-29 DIAGNOSIS — Z85.819: ICD-10-CM

## 2023-03-29 DIAGNOSIS — R13.10: ICD-10-CM

## 2023-03-29 DIAGNOSIS — R65.20: ICD-10-CM

## 2023-03-29 DIAGNOSIS — D64.9: ICD-10-CM

## 2023-03-29 DIAGNOSIS — A41.9: Primary | ICD-10-CM

## 2023-03-29 DIAGNOSIS — G92.8: ICD-10-CM

## 2023-03-29 DIAGNOSIS — E03.9: ICD-10-CM

## 2023-03-29 DIAGNOSIS — R62.50: ICD-10-CM

## 2023-03-29 DIAGNOSIS — Z98.890: ICD-10-CM

## 2023-03-29 DIAGNOSIS — J69.0: ICD-10-CM

## 2023-03-29 DIAGNOSIS — J96.01: ICD-10-CM

## 2023-03-29 DIAGNOSIS — Z79.899: ICD-10-CM

## 2023-03-29 DIAGNOSIS — Z87.39: ICD-10-CM

## 2023-03-29 DIAGNOSIS — Z92.3: ICD-10-CM

## 2023-03-29 PROCEDURE — A4223 INFUSION SUPPLIES W/O PUMP: HCPCS

## 2023-03-29 PROCEDURE — C9113 INJ PANTOPRAZOLE SODIUM, VIA: HCPCS

## 2023-03-29 PROCEDURE — G0378 HOSPITAL OBSERVATION PER HR: HCPCS

## 2023-03-29 PROCEDURE — A6253 ABSORPT DRG > 48 SQ IN W/O B: HCPCS

## 2023-03-29 RX ADMIN — ENOXAPARIN SODIUM SCH MG: 40 INJECTION SUBCUTANEOUS at 23:30

## 2023-03-30 VITALS — SYSTOLIC BLOOD PRESSURE: 131 MMHG | DIASTOLIC BLOOD PRESSURE: 80 MMHG

## 2023-03-30 VITALS — DIASTOLIC BLOOD PRESSURE: 78 MMHG | SYSTOLIC BLOOD PRESSURE: 119 MMHG

## 2023-03-30 VITALS — SYSTOLIC BLOOD PRESSURE: 158 MMHG | DIASTOLIC BLOOD PRESSURE: 90 MMHG

## 2023-03-30 VITALS — SYSTOLIC BLOOD PRESSURE: 127 MMHG | DIASTOLIC BLOOD PRESSURE: 82 MMHG

## 2023-03-30 VITALS — SYSTOLIC BLOOD PRESSURE: 99 MMHG | DIASTOLIC BLOOD PRESSURE: 65 MMHG

## 2023-03-30 LAB
BASOPHILS # BLD AUTO: 0.1 K/UL (ref 0–0.2)
BASOPHILS NFR BLD AUTO: 0.5 % (ref 0–2)
BILIRUB UR QL STRIP: NEGATIVE
BUN SERPL-MCNC: 21 MG/DL (ref 7–18)
CALCIUM SERPL-MCNC: 9.1 MG/DL (ref 8.5–10.1)
CHLORIDE SERPL-SCNC: 106 MMOL/L (ref 98–107)
CHOLEST SERPL-MCNC: 84 MG/DL (ref ?–200)
CO2 SERPL-SCNC: 26 MMOL/L (ref 21–32)
COLOR UR: YELLOW
CREAT SERPL-MCNC: 0.6 MG/DL (ref 0.6–1.3)
EOSINOPHIL NFR BLD AUTO: 0.2 % (ref 0–6)
GLUCOSE SERPL-MCNC: 100 MG/DL (ref 74–106)
GLUCOSE UR STRIP-MCNC: NEGATIVE MG/DL
HCT VFR BLD AUTO: 35 % (ref 39–51)
HDLC SERPL-MCNC: 37 MG/DL (ref 40–60)
HGB BLD-MCNC: 11.4 G/DL (ref 13.5–17.5)
IRON SERPL-MCNC: 10 UG/DL (ref 50–175)
LDLC SERPL DIRECT ASSAY-MCNC: 46 MG/DL (ref 0–99)
LEUKOCYTE ESTERASE UR QL STRIP: NEGATIVE
LYMPHOCYTES NFR BLD AUTO: 1.5 K/UL (ref 0.8–4.8)
LYMPHOCYTES NFR BLD AUTO: 10.5 % (ref 20–44)
MAGNESIUM SERPL-MCNC: 2.1 MG/DL (ref 1.8–2.4)
MCHC RBC AUTO-ENTMCNC: 33 G/DL (ref 31–36)
MCV RBC AUTO: 87 FL (ref 80–96)
MONOCYTES NFR BLD AUTO: 1 K/UL (ref 0.1–1.3)
MONOCYTES NFR BLD AUTO: 7.2 % (ref 2–12)
NEUTROPHILS # BLD AUTO: 11.6 K/UL (ref 1.8–8.9)
NEUTROPHILS NFR BLD AUTO: 81.6 % (ref 43–81)
NITRITE UR QL STRIP: NEGATIVE
PH UR STRIP: 6 [PH] (ref 5–8)
PHOSPHATE SERPL-MCNC: 2.2 MG/DL (ref 2.5–4.9)
PLATELET # BLD AUTO: 215 K/UL (ref 150–450)
POTASSIUM SERPL-SCNC: 3.6 MMOL/L (ref 3.5–5.1)
PROT UR QL STRIP: (no result) MG/DL
RBC # BLD AUTO: 4 MIL/UL (ref 4.5–6)
RBC #/AREA URNS HPF: (no result) /HPF (ref 0–2)
SODIUM SERPL-SCNC: 139 MMOL/L (ref 136–145)
T4 (THYROXINE): 7.6 UG/DL (ref 4.7–13.3)
TIBC SERPL-MCNC: 217 UG/DL (ref 250–450)
TRIGL SERPL-MCNC: 70 MG/DL (ref 30–150)
TSH SERPL DL<=0.005 MIU/L-ACNC: 5.66 UIU/ML (ref 0.36–3.74)
UROBILINOGEN UR STRIP-MCNC: 0.2 EU/DL
WBC #/AREA URNS HPF: (no result) /HPF (ref 0–3)
WBC NRBC COR # BLD AUTO: 14.2 K/UL (ref 4.3–11)

## 2023-03-30 RX ADMIN — LEVOTHYROXINE SODIUM SCH MCG: 75 TABLET ORAL at 08:50

## 2023-03-30 RX ADMIN — OLANZAPINE SCH MG: 10 TABLET ORAL at 17:08

## 2023-03-30 RX ADMIN — CHLORHEXIDINE GLUCONATE 0.12% ORAL RINSE SCH ML: 1.2 LIQUID ORAL at 21:33

## 2023-03-30 RX ADMIN — ALBUTEROL SULFATE SCH MG: 2.5 SOLUTION RESPIRATORY (INHALATION) at 20:23

## 2023-03-30 RX ADMIN — ALBUTEROL SULFATE SCH MG: 2.5 SOLUTION RESPIRATORY (INHALATION) at 08:45

## 2023-03-30 RX ADMIN — CEFEPIME HYDROCHLORIDE SCH MLS/HR: 1 INJECTION, POWDER, FOR SOLUTION INTRAMUSCULAR; INTRAVENOUS at 14:09

## 2023-03-30 RX ADMIN — Medication SCH MG: at 20:23

## 2023-03-30 RX ADMIN — PENTOXIFYLLINE SCH MG: 400 TABLET, FILM COATED, EXTENDED RELEASE ORAL at 14:09

## 2023-03-30 RX ADMIN — Medication SCH MG: at 15:17

## 2023-03-30 RX ADMIN — ALBUTEROL SULFATE SCH MG: 2.5 SOLUTION RESPIRATORY (INHALATION) at 15:17

## 2023-03-30 RX ADMIN — CHLORHEXIDINE GLUCONATE 0.12% ORAL RINSE SCH ML: 1.2 LIQUID ORAL at 08:45

## 2023-03-30 RX ADMIN — MUPIROCIN SCH GM: 20 OINTMENT TOPICAL at 21:34

## 2023-03-30 RX ADMIN — QUETIAPINE SCH MG: 25 TABLET, FILM COATED ORAL at 08:46

## 2023-03-30 RX ADMIN — Medication SCH MG: at 08:45

## 2023-03-30 RX ADMIN — PENTOXIFYLLINE SCH MG: 400 TABLET, FILM COATED, EXTENDED RELEASE ORAL at 17:10

## 2023-03-30 RX ADMIN — QUETIAPINE SCH MG: 25 TABLET, FILM COATED ORAL at 17:08

## 2023-03-30 RX ADMIN — QUETIAPINE SCH MG: 25 TABLET, FILM COATED ORAL at 21:21

## 2023-03-30 RX ADMIN — Medication SCH MG: at 12:15

## 2023-03-30 RX ADMIN — MUPIROCIN CALCIUM SCH APPLIC: 20 CREAM TOPICAL at 08:55

## 2023-03-30 RX ADMIN — OLANZAPINE SCH MG: 5 TABLET, FILM COATED ORAL at 08:46

## 2023-03-30 RX ADMIN — CEFEPIME HYDROCHLORIDE SCH MLS/HR: 1 INJECTION, POWDER, FOR SOLUTION INTRAMUSCULAR; INTRAVENOUS at 08:49

## 2023-03-30 RX ADMIN — MUPIROCIN SCH GM: 20 OINTMENT TOPICAL at 09:00

## 2023-03-30 RX ADMIN — ALBUTEROL SULFATE SCH MG: 2.5 SOLUTION RESPIRATORY (INHALATION) at 12:15

## 2023-03-30 RX ADMIN — MUPIROCIN CALCIUM SCH APPLIC: 20 CREAM TOPICAL at 17:08

## 2023-03-30 RX ADMIN — DEXTROSE MONOHYDRATE SCH MLS/HR: 50 INJECTION, SOLUTION INTRAVENOUS at 17:11

## 2023-03-30 RX ADMIN — TEMAZEPAM SCH MG: 15 CAPSULE ORAL at 21:20

## 2023-03-30 RX ADMIN — PENTOXIFYLLINE SCH MG: 400 TABLET, FILM COATED, EXTENDED RELEASE ORAL at 08:50

## 2023-03-30 RX ADMIN — OXYCODONE HYDROCHLORIDE AND ACETAMINOPHEN SCH MG: 500 TABLET ORAL at 17:08

## 2023-03-30 RX ADMIN — ENOXAPARIN SODIUM SCH MG: 40 INJECTION SUBCUTANEOUS at 08:54

## 2023-03-30 RX ADMIN — CITALOPRAM SCH MG: 20 TABLET ORAL at 08:46

## 2023-03-30 RX ADMIN — CEFEPIME HYDROCHLORIDE SCH MLS/HR: 1 INJECTION, POWDER, FOR SOLUTION INTRAMUSCULAR; INTRAVENOUS at 21:20

## 2023-03-30 RX ADMIN — OXYCODONE HYDROCHLORIDE AND ACETAMINOPHEN SCH MG: 500 TABLET ORAL at 08:46

## 2023-03-30 NOTE — NUR
ms rn

still finishing antibiotics, cannot finish,patient is repeatedly going to the bathroom, no 
distress noted, meds given all needs attended.

## 2023-03-30 NOTE — NUR
DIRECT ADMIT NOTES



Received report from Rosy Simpson. Pt arrived via EMT transportation from [facility] @2110 accompanied be EMTs and father, Abhijeet [id number], able to make 
needs known. ON NC 5LPM and tolerating well. IV access in LAC #22G and LFA #20G. IV is 
intact, patent, and flushing well. Safety precautions in place: bed in lowest, locked 
position, siderails upX2, and brakes on. Table and call light within reach. All needs met at 
this time.

## 2023-03-30 NOTE — NUR
ms rn

received on bed, awake,oriented x3, not in any form of distress, respirations even and 
unlabored,no sob noted ,on room air, saturating 98%,denies pain at this time, noted to have 
chin wound w/ dressing dry and intact, will monitor patient.

## 2023-03-30 NOTE — NUR
PATIENT FOUND W/O 02 ON. PATIENT IS STABLE, AWAKE, AND NO SOB NOTED.

-------------------------------------------------------------------------------

Addendum: 03/30/23 at 1519 by JHOANA OBREGON RT

-------------------------------------------------------------------------------

Amended: Links added.

## 2023-03-30 NOTE — NUR
Rn Closing Notes



Pt strolling through halls with mother. AOx3, able to make needs known. ON RA and tolerating 
well. IV access in LAC #22G and LFA #20G. IV is intact, patent, and flushing well. All 
orders carried out. All needs met. Pt kept clean and dry. Safety precautions in place: bed 
in lowest, locked position, siderails upX2, and brakes on. Table and call light within 
reach. Will endorse to oncoming shift for CHRIS.

## 2023-03-30 NOTE — NUR
ms rn

 breakfast served, due meds given,tolerated well. was seen by speech therapist w/ pureed 
food recommendation.

## 2023-03-30 NOTE — NUR
TELE RN OPENING NOTES



RECEIVED PT IN BED AWAKE AND ALERT FOR CONTINUITY OF CARE. A/O X 3, DELAYED. PT IS ABLE TO 
MAKE NEEDS KNOWN. NO S/S OF PAIN AT THIS TIME. ON ROOM AIR, BREATHING EVEN AND UNLABORED, NO 
DISTRESS OR SOB NOTED. IV ACCESS ON RAC #22G, CURRENTLY ON VANCO WITH NS @ 75ML/HR. PATIENT 
WITH EXTERNAL CARDIAC MONITOR WITH CURRENT READING OF , NO CARDIAC DISTRESS NOTED. 
SAFETY PRECAUTIONS IN PLACE WITH BED IN LOWEST LOCKED POSITION. BED ALARM ON. SIDE RIALS UP 
X 2. CALL LIGHT WITHIN EASY REACH. WILL CONTINUE THE PLAN OF CARE AND CARRY OUT ACTIVE MD 
ORDERS.

## 2023-03-30 NOTE — NUR
WOUND CARE CONSULT: PT PRESENTS WITH JAW WOUNDS WITH EXPOSED BONE, PRESENT ON ADMISSION. 
PT'S MOTHER DOING WOUND CARE. DISCUSSED SKIN PROTECTION AND WOUND CARE WITH NURSING STAFF 
AND MOTHER AT BEDSIDE. DR HERNANDEZ CALLED FOR SURGICAL CONSULT. MD IN AGREEMENT WITH PLAN 
OF CARE. PT IS AMBULATORY.

## 2023-03-31 VITALS — DIASTOLIC BLOOD PRESSURE: 60 MMHG | SYSTOLIC BLOOD PRESSURE: 112 MMHG

## 2023-03-31 VITALS — DIASTOLIC BLOOD PRESSURE: 76 MMHG | SYSTOLIC BLOOD PRESSURE: 129 MMHG

## 2023-03-31 VITALS — SYSTOLIC BLOOD PRESSURE: 103 MMHG | DIASTOLIC BLOOD PRESSURE: 70 MMHG

## 2023-03-31 VITALS — DIASTOLIC BLOOD PRESSURE: 57 MMHG | SYSTOLIC BLOOD PRESSURE: 101 MMHG

## 2023-03-31 LAB
BASOPHILS # BLD AUTO: 0.1 K/UL (ref 0–0.2)
BASOPHILS NFR BLD AUTO: 0.6 % (ref 0–2)
BUN SERPL-MCNC: 15 MG/DL (ref 7–18)
CALCIUM SERPL-MCNC: 9.1 MG/DL (ref 8.5–10.1)
CHLORIDE SERPL-SCNC: 104 MMOL/L (ref 98–107)
CO2 SERPL-SCNC: 26 MMOL/L (ref 21–32)
CREAT SERPL-MCNC: 0.7 MG/DL (ref 0.6–1.3)
EOSINOPHIL NFR BLD AUTO: 2.7 % (ref 0–6)
GLUCOSE SERPL-MCNC: 85 MG/DL (ref 74–106)
HCT VFR BLD AUTO: 36 % (ref 39–51)
HGB BLD-MCNC: 11.8 G/DL (ref 13.5–17.5)
LYMPHOCYTES NFR BLD AUTO: 1.1 K/UL (ref 0.8–4.8)
LYMPHOCYTES NFR BLD AUTO: 13.3 % (ref 20–44)
MAGNESIUM SERPL-MCNC: 2.4 MG/DL (ref 1.8–2.4)
MCHC RBC AUTO-ENTMCNC: 33 G/DL (ref 31–36)
MCV RBC AUTO: 87 FL (ref 80–96)
MONOCYTES NFR BLD AUTO: 0.7 K/UL (ref 0.1–1.3)
MONOCYTES NFR BLD AUTO: 8.3 % (ref 2–12)
NEUTROPHILS # BLD AUTO: 6.4 K/UL (ref 1.8–8.9)
NEUTROPHILS NFR BLD AUTO: 75.1 % (ref 43–81)
PHOSPHATE SERPL-MCNC: 4 MG/DL (ref 2.5–4.9)
PLATELET # BLD AUTO: 240 K/UL (ref 150–450)
POTASSIUM SERPL-SCNC: 3.5 MMOL/L (ref 3.5–5.1)
RBC # BLD AUTO: 4.13 MIL/UL (ref 4.5–6)
SODIUM SERPL-SCNC: 138 MMOL/L (ref 136–145)
WBC NRBC COR # BLD AUTO: 8.5 K/UL (ref 4.3–11)

## 2023-03-31 RX ADMIN — CHLORHEXIDINE GLUCONATE 0.12% ORAL RINSE SCH ML: 1.2 LIQUID ORAL at 08:22

## 2023-03-31 RX ADMIN — PENTOXIFYLLINE SCH MG: 400 TABLET, FILM COATED, EXTENDED RELEASE ORAL at 13:15

## 2023-03-31 RX ADMIN — ALBUTEROL SULFATE SCH MG: 2.5 SOLUTION RESPIRATORY (INHALATION) at 15:08

## 2023-03-31 RX ADMIN — QUETIAPINE SCH MG: 25 TABLET, FILM COATED ORAL at 08:23

## 2023-03-31 RX ADMIN — OXYCODONE HYDROCHLORIDE AND ACETAMINOPHEN SCH MG: 500 TABLET ORAL at 16:52

## 2023-03-31 RX ADMIN — ALBUTEROL SULFATE SCH MG: 2.5 SOLUTION RESPIRATORY (INHALATION) at 07:42

## 2023-03-31 RX ADMIN — OXYCODONE HYDROCHLORIDE AND ACETAMINOPHEN SCH MG: 500 TABLET ORAL at 08:22

## 2023-03-31 RX ADMIN — CEFEPIME HYDROCHLORIDE SCH MLS/HR: 1 INJECTION, POWDER, FOR SOLUTION INTRAMUSCULAR; INTRAVENOUS at 21:24

## 2023-03-31 RX ADMIN — MUPIROCIN CALCIUM SCH APPLIC: 20 CREAM TOPICAL at 08:29

## 2023-03-31 RX ADMIN — MUPIROCIN CALCIUM SCH APPLIC: 20 CREAM TOPICAL at 16:50

## 2023-03-31 RX ADMIN — Medication SCH MG: at 15:08

## 2023-03-31 RX ADMIN — OLANZAPINE SCH MG: 5 TABLET, FILM COATED ORAL at 08:23

## 2023-03-31 RX ADMIN — ALBUTEROL SULFATE SCH MG: 2.5 SOLUTION RESPIRATORY (INHALATION) at 11:00

## 2023-03-31 RX ADMIN — Medication SCH MG: at 07:42

## 2023-03-31 RX ADMIN — PENTOXIFYLLINE SCH MG: 400 TABLET, FILM COATED, EXTENDED RELEASE ORAL at 17:02

## 2023-03-31 RX ADMIN — LEVOTHYROXINE SODIUM SCH MCG: 75 TABLET ORAL at 08:22

## 2023-03-31 RX ADMIN — MUPIROCIN SCH APPLIC: 20 OINTMENT TOPICAL at 21:51

## 2023-03-31 RX ADMIN — OLANZAPINE SCH MG: 10 TABLET ORAL at 17:01

## 2023-03-31 RX ADMIN — DEXTROSE MONOHYDRATE SCH MLS/HR: 50 INJECTION, SOLUTION INTRAVENOUS at 16:50

## 2023-03-31 RX ADMIN — Medication SCH MG: at 20:03

## 2023-03-31 RX ADMIN — CITALOPRAM SCH MG: 20 TABLET ORAL at 08:23

## 2023-03-31 RX ADMIN — QUETIAPINE SCH MG: 25 TABLET, FILM COATED ORAL at 17:01

## 2023-03-31 RX ADMIN — PANTOPRAZOLE SODIUM SCH MG: 40 TABLET, DELAYED RELEASE ORAL at 08:30

## 2023-03-31 RX ADMIN — MUPIROCIN SCH APPLIC: 20 OINTMENT TOPICAL at 08:43

## 2023-03-31 RX ADMIN — DEXTROSE MONOHYDRATE SCH MLS/HR: 50 INJECTION, SOLUTION INTRAVENOUS at 08:21

## 2023-03-31 RX ADMIN — QUETIAPINE SCH MG: 25 TABLET, FILM COATED ORAL at 21:24

## 2023-03-31 RX ADMIN — DEXTROSE MONOHYDRATE SCH MLS/HR: 50 INJECTION, SOLUTION INTRAVENOUS at 00:08

## 2023-03-31 RX ADMIN — ALBUTEROL SULFATE SCH MG: 2.5 SOLUTION RESPIRATORY (INHALATION) at 20:03

## 2023-03-31 RX ADMIN — CEFEPIME HYDROCHLORIDE SCH MLS/HR: 1 INJECTION, POWDER, FOR SOLUTION INTRAMUSCULAR; INTRAVENOUS at 13:15

## 2023-03-31 RX ADMIN — CHLORHEXIDINE GLUCONATE 0.12% ORAL RINSE SCH ML: 1.2 LIQUID ORAL at 21:24

## 2023-03-31 RX ADMIN — ENOXAPARIN SODIUM SCH MG: 40 INJECTION SUBCUTANEOUS at 08:44

## 2023-03-31 RX ADMIN — CEFEPIME HYDROCHLORIDE SCH MLS/HR: 1 INJECTION, POWDER, FOR SOLUTION INTRAMUSCULAR; INTRAVENOUS at 04:04

## 2023-03-31 RX ADMIN — Medication SCH MG: at 11:00

## 2023-03-31 RX ADMIN — PENTOXIFYLLINE SCH MG: 400 TABLET, FILM COATED, EXTENDED RELEASE ORAL at 08:29

## 2023-03-31 RX ADMIN — TEMAZEPAM SCH MG: 15 CAPSULE ORAL at 21:25

## 2023-03-31 NOTE — NUR
TELE RN OPENING NOTES



RECEIVED PT IN BED AWAKE AND ALERT. A/O X 3, DELAYED. PT IS ABLE TO MAKE NEEDS KNOWN. NO S/S 
OF PAIN AT THIS TIME. ON ROOM AIR, BREATHING EVEN AND UNLABORED, NO DISTRESS OR SOB NOTED. 
IV ACCESS ON RAC #22G, CURRENTLY ON VANCO WITH NS @ 75ML/HR. PATIENT WITH EXTERNAL CARDIAC 
MONITOR , NO CARDIAC DISTRESS NOTED. SAFETY PRECAUTIONS IN PLACE WITH BED IN LOWEST LOCKED 
POSITION. BED ALARM ON. SIDE RIALS UP X 2. CALL LIGHT WITHIN EASY REACH. FATHER AT BEDSIDE. 
WILL CONTINUE TO MONITOR.

## 2023-03-31 NOTE — NUR
ms rn

records received but no blood culture result,dr. huerta made aware, perhaps result not yet 
available per md.

## 2023-03-31 NOTE — NUR
ms rn

pharmacy called, vanco level - 29- vanco stopped, only half of 250 cc give, all needs 
attended.

## 2023-03-31 NOTE — NUR
TELERN

DUE MEDS ADMINISTERED.  ABLE TO SWALLOW MEDS, ABLE TO FOLLOW INSTRUCTIONS, MOTHER ASSISTING. 
 BRP VOIDED FREELY. PAINFREE. REMAINS SR ON THE  MONITOR .  CHIN WOUND DRESSING CHANGED BY 
MOTHER.

## 2023-03-31 NOTE — NUR
TELERN

FULLY AWAKE MOTHER OF PATIENT AT BEDSIDE.  WILL STAY OVERNIGHT.  PATIENT ON RA SATURATING 
98%.  MOTHER ASSISTING PATIENT NEEDS PLAN OF CARE AND MEDICATION REGIMEN DISCUSSED WITH 
MOTHER WELL UNDERSTOOD. NO NEEDS FOR NOW.  SR ON THE MONITOR.

## 2023-03-31 NOTE — NUR
TELE RN CLOSING NOTES



PT IN BED AWAKE AND ALERT FOR CONTINUITY OF CARE. A/O X 3, DELAYED. PT IS ABLE TO MAKE NEEDS 
KNOWN. NO S/S OF PAIN AT THIS TIME. ON ROOM AIR, BREATHING EVEN AND UNLABORED, NO DISTRESS 
OR SOB NOTED. IV ACCESS ON RAC #22G, SL. PATIENT WITH EXTERNAL CARDIAC MONITOR WITH CURRENT 
READING OF SR 80, NO CARDIAC DISTRESS NOTED. WOUND CARE DONE. ALL NEEDS ATTENDED.  SAFETY 
PRECAUTIONS MAINTAINED WITH BED IN LOWEST LOCKED POSITION. BED ALARM ON. SIDE RIALS UP X 2. 
CALL LIGHT WITHIN EASY REACH. WILL ENDORSE TO THE NEXT SHIFT.

## 2023-03-31 NOTE — NUR
ms gomez

faxed request of blood culture result to [facility], emailed to their records department ,per 
their protocol.

## 2023-04-01 VITALS — DIASTOLIC BLOOD PRESSURE: 71 MMHG | SYSTOLIC BLOOD PRESSURE: 102 MMHG

## 2023-04-01 VITALS — DIASTOLIC BLOOD PRESSURE: 94 MMHG | SYSTOLIC BLOOD PRESSURE: 150 MMHG

## 2023-04-01 VITALS — SYSTOLIC BLOOD PRESSURE: 118 MMHG | DIASTOLIC BLOOD PRESSURE: 84 MMHG

## 2023-04-01 VITALS — DIASTOLIC BLOOD PRESSURE: 87 MMHG | SYSTOLIC BLOOD PRESSURE: 131 MMHG

## 2023-04-01 VITALS — DIASTOLIC BLOOD PRESSURE: 98 MMHG | SYSTOLIC BLOOD PRESSURE: 139 MMHG

## 2023-04-01 VITALS — DIASTOLIC BLOOD PRESSURE: 69 MMHG | SYSTOLIC BLOOD PRESSURE: 100 MMHG

## 2023-04-01 LAB
BASOPHILS # BLD AUTO: 0.1 K/UL (ref 0–0.2)
BASOPHILS NFR BLD AUTO: 0.9 % (ref 0–2)
BUN SERPL-MCNC: 15 MG/DL (ref 7–18)
CALCIUM SERPL-MCNC: 9.2 MG/DL (ref 8.5–10.1)
CHLORIDE SERPL-SCNC: 105 MMOL/L (ref 98–107)
CO2 SERPL-SCNC: 26 MMOL/L (ref 21–32)
CREAT SERPL-MCNC: 0.9 MG/DL (ref 0.6–1.3)
EOSINOPHIL NFR BLD AUTO: 4 % (ref 0–6)
GLUCOSE SERPL-MCNC: 90 MG/DL (ref 74–106)
HCT VFR BLD AUTO: 35 % (ref 39–51)
HGB BLD-MCNC: 11.7 G/DL (ref 13.5–17.5)
LYMPHOCYTES NFR BLD AUTO: 1 K/UL (ref 0.8–4.8)
LYMPHOCYTES NFR BLD AUTO: 11.9 % (ref 20–44)
MAGNESIUM SERPL-MCNC: 2.4 MG/DL (ref 1.8–2.4)
MCHC RBC AUTO-ENTMCNC: 34 G/DL (ref 31–36)
MCV RBC AUTO: 86 FL (ref 80–96)
MONOCYTES NFR BLD AUTO: 0.6 K/UL (ref 0.1–1.3)
MONOCYTES NFR BLD AUTO: 7.4 % (ref 2–12)
NEUTROPHILS # BLD AUTO: 6.2 K/UL (ref 1.8–8.9)
NEUTROPHILS NFR BLD AUTO: 75.8 % (ref 43–81)
PHOSPHATE SERPL-MCNC: 5 MG/DL (ref 2.5–4.9)
PLATELET # BLD AUTO: 270 K/UL (ref 150–450)
POTASSIUM SERPL-SCNC: 3.4 MMOL/L (ref 3.5–5.1)
RBC # BLD AUTO: 4.04 MIL/UL (ref 4.5–6)
SODIUM SERPL-SCNC: 142 MMOL/L (ref 136–145)
WBC NRBC COR # BLD AUTO: 8.2 K/UL (ref 4.3–11)

## 2023-04-01 RX ADMIN — OXYCODONE HYDROCHLORIDE AND ACETAMINOPHEN SCH MG: 500 TABLET ORAL at 17:42

## 2023-04-01 RX ADMIN — DEXTROSE MONOHYDRATE SCH MLS/HR: 50 INJECTION, SOLUTION INTRAVENOUS at 18:09

## 2023-04-01 RX ADMIN — Medication SCH MG: at 19:40

## 2023-04-01 RX ADMIN — TEMAZEPAM SCH MG: 15 CAPSULE ORAL at 21:45

## 2023-04-01 RX ADMIN — DEXTROSE MONOHYDRATE SCH MLS/HR: 50 INJECTION, SOLUTION INTRAVENOUS at 09:47

## 2023-04-01 RX ADMIN — MUPIROCIN SCH APPLIC: 20 OINTMENT TOPICAL at 09:47

## 2023-04-01 RX ADMIN — CEFEPIME HYDROCHLORIDE SCH MLS/HR: 1 INJECTION, POWDER, FOR SOLUTION INTRAMUSCULAR; INTRAVENOUS at 21:11

## 2023-04-01 RX ADMIN — PENTOXIFYLLINE SCH MG: 400 TABLET, FILM COATED, EXTENDED RELEASE ORAL at 18:10

## 2023-04-01 RX ADMIN — ENOXAPARIN SODIUM SCH MG: 40 INJECTION SUBCUTANEOUS at 09:43

## 2023-04-01 RX ADMIN — CHLORHEXIDINE GLUCONATE 0.12% ORAL RINSE SCH ML: 1.2 LIQUID ORAL at 09:43

## 2023-04-01 RX ADMIN — CEFEPIME HYDROCHLORIDE SCH MLS/HR: 1 INJECTION, POWDER, FOR SOLUTION INTRAMUSCULAR; INTRAVENOUS at 04:32

## 2023-04-01 RX ADMIN — PENTOXIFYLLINE SCH MG: 400 TABLET, FILM COATED, EXTENDED RELEASE ORAL at 09:47

## 2023-04-01 RX ADMIN — QUETIAPINE SCH MG: 25 TABLET, FILM COATED ORAL at 17:41

## 2023-04-01 RX ADMIN — PANTOPRAZOLE SODIUM SCH MG: 40 TABLET, DELAYED RELEASE ORAL at 09:50

## 2023-04-01 RX ADMIN — DEXTROSE MONOHYDRATE SCH MLS/HR: 50 INJECTION, SOLUTION INTRAVENOUS at 01:07

## 2023-04-01 RX ADMIN — ALBUTEROL SULFATE SCH MG: 2.5 SOLUTION RESPIRATORY (INHALATION) at 11:17

## 2023-04-01 RX ADMIN — Medication SCH MG: at 15:14

## 2023-04-01 RX ADMIN — PENTOXIFYLLINE SCH MG: 400 TABLET, FILM COATED, EXTENDED RELEASE ORAL at 13:49

## 2023-04-01 RX ADMIN — MUPIROCIN CALCIUM SCH APPLIC: 20 CREAM TOPICAL at 09:00

## 2023-04-01 RX ADMIN — MUPIROCIN SCH APPLIC: 20 OINTMENT TOPICAL at 21:12

## 2023-04-01 RX ADMIN — QUETIAPINE SCH MG: 25 TABLET, FILM COATED ORAL at 21:46

## 2023-04-01 RX ADMIN — LEVOTHYROXINE SODIUM SCH MCG: 75 TABLET ORAL at 07:41

## 2023-04-01 RX ADMIN — Medication SCH MG: at 07:40

## 2023-04-01 RX ADMIN — CITALOPRAM SCH MG: 20 TABLET ORAL at 09:42

## 2023-04-01 RX ADMIN — CHLORHEXIDINE GLUCONATE 0.12% ORAL RINSE SCH ML: 1.2 LIQUID ORAL at 21:11

## 2023-04-01 RX ADMIN — OXYCODONE HYDROCHLORIDE AND ACETAMINOPHEN SCH MG: 500 TABLET ORAL at 09:42

## 2023-04-01 RX ADMIN — OLANZAPINE SCH MG: 10 TABLET ORAL at 17:41

## 2023-04-01 RX ADMIN — OLANZAPINE SCH MG: 5 TABLET, FILM COATED ORAL at 09:42

## 2023-04-01 RX ADMIN — Medication SCH MG: at 11:16

## 2023-04-01 RX ADMIN — ALBUTEROL SULFATE SCH MG: 2.5 SOLUTION RESPIRATORY (INHALATION) at 15:14

## 2023-04-01 RX ADMIN — MUPIROCIN CALCIUM SCH APPLIC: 20 CREAM TOPICAL at 17:42

## 2023-04-01 RX ADMIN — ALBUTEROL SULFATE SCH MG: 2.5 SOLUTION RESPIRATORY (INHALATION) at 07:40

## 2023-04-01 RX ADMIN — QUETIAPINE SCH MG: 25 TABLET, FILM COATED ORAL at 09:42

## 2023-04-01 RX ADMIN — ALBUTEROL SULFATE SCH MG: 2.5 SOLUTION RESPIRATORY (INHALATION) at 19:40

## 2023-04-01 RX ADMIN — CEFEPIME HYDROCHLORIDE SCH MLS/HR: 1 INJECTION, POWDER, FOR SOLUTION INTRAMUSCULAR; INTRAVENOUS at 13:49

## 2023-04-01 NOTE — NUR
RT



Pt recvd awake on room air with family memeber at bedside. No SOB or respiratory distress 
noted at this time. neb tx given and salas well with no adverse reaction noted. Diminished BS. 
Spo2 >92%

## 2023-04-01 NOTE — NUR
TELE RN NOTES

RECEIVED SITTING ON EDGE OF BED,A/O X3,DEVELOPMENT DELAYED,ABLE TO ANSWER SIMPLE 
QUESTION.FAMILY MEMBER AT BEDSIDE.SALINE LOCK LEFT UPPER ARM INTACT AND PATENT.AMBULATE WITH 
STEADY GAIT,S/P RESECTION OF CHIN,DRESSING INTACT AND DRY,WILL CONTINUE TO MONITOR 
STATUS.CALL LIGHT IN REACH,NEEDS ANTICIPATED.

## 2023-04-01 NOTE — NUR
ms rn

received on bed, awake,mom at bedside, not in any form of distress, patient is mentally 
delayed, came in w/ sepsis,denies pain at this time, safety precaution maintained,will 
monitor patient.

## 2023-04-01 NOTE — NUR
RN NOTE

TRANSFER OF CARE FROM GREG HAMEED. PATIENT IN BED; ASLEEP. EASILY AROUSABLE BY TOUCH. ON ROOM 
AIR; TOLERATING WELL. IN NO ACUTE DISTRESS. SAFETY MEASURES IMPLEMENTED: CALL LIGHT AND 
TABLE WITHIN REACH, SIDE RAILS UP X 2, BED IN LOWEST LOCKED POSITION. WILL CONTINUE TO 
MONITOR THROUGHOUT SHIFT.

## 2023-04-01 NOTE — NUR
RN CLOSING NOTE

PT IN BED; AWAKE, A/O X 3, DEVELOPMENTALLY DELAYED. STABLE ON ROOM AIR. IN NO ACUTE 
DISTRESS. ABLE TO MAKE NEEDS KNOWN. NO S/S OF PAIN AT THIS TIME. WITH IV ACCESS ON ROBEL 22g; 
PATENT, INTACT AND SL. WITH EXTERNAL CARDIAC MONITOR WITH CURRENT READING OF SR 80. ALL 
NEEDS ATTENDED. SAFETY PRECAUTIONS MAINTAINED WITH BED IN LOWEST LOCKED POSITION. BED ALARM 
ON. SIDE RIALS UP X 2. CALL LIGHT WITHIN EASY REACH. ENDORSED TO NEXT SHIFT FOR CHRIS.

## 2023-04-02 VITALS — SYSTOLIC BLOOD PRESSURE: 136 MMHG | DIASTOLIC BLOOD PRESSURE: 89 MMHG

## 2023-04-02 VITALS — DIASTOLIC BLOOD PRESSURE: 90 MMHG | SYSTOLIC BLOOD PRESSURE: 126 MMHG

## 2023-04-02 VITALS — SYSTOLIC BLOOD PRESSURE: 127 MMHG | DIASTOLIC BLOOD PRESSURE: 75 MMHG

## 2023-04-02 VITALS — DIASTOLIC BLOOD PRESSURE: 85 MMHG | SYSTOLIC BLOOD PRESSURE: 122 MMHG

## 2023-04-02 VITALS — DIASTOLIC BLOOD PRESSURE: 91 MMHG | SYSTOLIC BLOOD PRESSURE: 136 MMHG

## 2023-04-02 LAB
BASOPHILS # BLD AUTO: 0.1 K/UL (ref 0–0.2)
BASOPHILS NFR BLD AUTO: 1.2 % (ref 0–2)
BUN SERPL-MCNC: 11 MG/DL (ref 7–18)
CALCIUM SERPL-MCNC: 9.1 MG/DL (ref 8.5–10.1)
CHLORIDE SERPL-SCNC: 105 MMOL/L (ref 98–107)
CO2 SERPL-SCNC: 25 MMOL/L (ref 21–32)
CREAT SERPL-MCNC: 0.7 MG/DL (ref 0.6–1.3)
EOSINOPHIL NFR BLD AUTO: 4.6 % (ref 0–6)
GLUCOSE SERPL-MCNC: 96 MG/DL (ref 74–106)
HCT VFR BLD AUTO: 36 % (ref 39–51)
HGB BLD-MCNC: 11.8 G/DL (ref 13.5–17.5)
LYMPHOCYTES NFR BLD AUTO: 1.1 K/UL (ref 0.8–4.8)
LYMPHOCYTES NFR BLD AUTO: 15.9 % (ref 20–44)
MAGNESIUM SERPL-MCNC: 2.5 MG/DL (ref 1.8–2.4)
MCHC RBC AUTO-ENTMCNC: 33 G/DL (ref 31–36)
MCV RBC AUTO: 87 FL (ref 80–96)
MONOCYTES NFR BLD AUTO: 0.7 K/UL (ref 0.1–1.3)
MONOCYTES NFR BLD AUTO: 10.2 % (ref 2–12)
NEUTROPHILS # BLD AUTO: 4.9 K/UL (ref 1.8–8.9)
NEUTROPHILS NFR BLD AUTO: 68.1 % (ref 43–81)
PHOSPHATE SERPL-MCNC: 4.2 MG/DL (ref 2.5–4.9)
PLATELET # BLD AUTO: 278 K/UL (ref 150–450)
POTASSIUM SERPL-SCNC: 3.5 MMOL/L (ref 3.5–5.1)
RBC # BLD AUTO: 4.14 MIL/UL (ref 4.5–6)
SODIUM SERPL-SCNC: 139 MMOL/L (ref 136–145)
WBC NRBC COR # BLD AUTO: 7.2 K/UL (ref 4.3–11)

## 2023-04-02 RX ADMIN — CEFEPIME HYDROCHLORIDE SCH MLS/HR: 1 INJECTION, POWDER, FOR SOLUTION INTRAMUSCULAR; INTRAVENOUS at 13:21

## 2023-04-02 RX ADMIN — MUPIROCIN SCH APPLIC: 20 OINTMENT TOPICAL at 08:32

## 2023-04-02 RX ADMIN — OLANZAPINE SCH MG: 10 TABLET ORAL at 17:34

## 2023-04-02 RX ADMIN — PENTOXIFYLLINE SCH MG: 400 TABLET, FILM COATED, EXTENDED RELEASE ORAL at 13:21

## 2023-04-02 RX ADMIN — LEVOTHYROXINE SODIUM SCH MCG: 75 TABLET ORAL at 08:14

## 2023-04-02 RX ADMIN — DEXTROSE MONOHYDRATE SCH MLS/HR: 50 INJECTION, SOLUTION INTRAVENOUS at 08:13

## 2023-04-02 RX ADMIN — OXYCODONE HYDROCHLORIDE AND ACETAMINOPHEN SCH MG: 500 TABLET ORAL at 08:14

## 2023-04-02 RX ADMIN — PANTOPRAZOLE SODIUM SCH MG: 40 TABLET, DELAYED RELEASE ORAL at 08:14

## 2023-04-02 RX ADMIN — CEFEPIME HYDROCHLORIDE SCH MLS/HR: 1 INJECTION, POWDER, FOR SOLUTION INTRAMUSCULAR; INTRAVENOUS at 20:21

## 2023-04-02 RX ADMIN — ALBUTEROL SULFATE SCH MG: 2.5 SOLUTION RESPIRATORY (INHALATION) at 11:32

## 2023-04-02 RX ADMIN — CHLORHEXIDINE GLUCONATE 0.12% ORAL RINSE SCH ML: 1.2 LIQUID ORAL at 20:21

## 2023-04-02 RX ADMIN — SODIUM CHLORIDE SCH MLS/HR: 9 INJECTION, SOLUTION INTRAVENOUS at 20:06

## 2023-04-02 RX ADMIN — DEXTROSE MONOHYDRATE SCH MLS/HR: 50 INJECTION, SOLUTION INTRAVENOUS at 01:32

## 2023-04-02 RX ADMIN — MUPIROCIN CALCIUM SCH APPLIC: 20 CREAM TOPICAL at 16:29

## 2023-04-02 RX ADMIN — ENOXAPARIN SODIUM SCH MG: 40 INJECTION SUBCUTANEOUS at 08:26

## 2023-04-02 RX ADMIN — Medication SCH MG: at 07:50

## 2023-04-02 RX ADMIN — CEFEPIME HYDROCHLORIDE SCH MLS/HR: 1 INJECTION, POWDER, FOR SOLUTION INTRAMUSCULAR; INTRAVENOUS at 05:15

## 2023-04-02 RX ADMIN — QUETIAPINE SCH MG: 25 TABLET, FILM COATED ORAL at 08:14

## 2023-04-02 RX ADMIN — ALBUTEROL SULFATE SCH MG: 2.5 SOLUTION RESPIRATORY (INHALATION) at 15:29

## 2023-04-02 RX ADMIN — PENTOXIFYLLINE SCH MG: 400 TABLET, FILM COATED, EXTENDED RELEASE ORAL at 08:14

## 2023-04-02 RX ADMIN — ALBUTEROL SULFATE SCH MG: 2.5 SOLUTION RESPIRATORY (INHALATION) at 19:50

## 2023-04-02 RX ADMIN — MUPIROCIN SCH APPLIC: 20 OINTMENT TOPICAL at 20:22

## 2023-04-02 RX ADMIN — CITALOPRAM SCH MG: 20 TABLET ORAL at 08:14

## 2023-04-02 RX ADMIN — ALBUTEROL SULFATE SCH MG: 2.5 SOLUTION RESPIRATORY (INHALATION) at 07:50

## 2023-04-02 RX ADMIN — Medication SCH MG: at 11:32

## 2023-04-02 RX ADMIN — PENTOXIFYLLINE SCH MG: 400 TABLET, FILM COATED, EXTENDED RELEASE ORAL at 16:28

## 2023-04-02 RX ADMIN — OLANZAPINE SCH MG: 5 TABLET, FILM COATED ORAL at 08:16

## 2023-04-02 RX ADMIN — TEMAZEPAM SCH MG: 15 CAPSULE ORAL at 21:07

## 2023-04-02 RX ADMIN — QUETIAPINE SCH MG: 25 TABLET, FILM COATED ORAL at 21:06

## 2023-04-02 RX ADMIN — Medication SCH MG: at 19:50

## 2023-04-02 RX ADMIN — MUPIROCIN CALCIUM SCH APPLIC: 20 CREAM TOPICAL at 08:31

## 2023-04-02 RX ADMIN — CHLORHEXIDINE GLUCONATE 0.12% ORAL RINSE SCH ML: 1.2 LIQUID ORAL at 08:14

## 2023-04-02 RX ADMIN — QUETIAPINE SCH MG: 25 TABLET, FILM COATED ORAL at 16:28

## 2023-04-02 RX ADMIN — OXYCODONE HYDROCHLORIDE AND ACETAMINOPHEN SCH MG: 500 TABLET ORAL at 16:28

## 2023-04-02 RX ADMIN — Medication SCH MG: at 15:29

## 2023-04-02 RX ADMIN — DEXTROSE MONOHYDRATE SCH MLS/HR: 50 INJECTION, SOLUTION INTRAVENOUS at 16:28

## 2023-04-02 NOTE — NUR
TELE RN NOTES

AWAKE,A/O X3,VERBALIZED NEEDS,VERY THANKFUL OF THE SERVICE AND CARE,HE LIKES THE 
HOSPITAL.MED COMPLIANT.AMBULATE WITH STEADY GAIT,SISTER AT BEDSIDE.FOR DISCHARGE PLANNING 
AWAITING BLOOD CULTURES RESULT FOR [facility].

## 2023-04-02 NOTE — NUR
CHANGE OF SHIFT REPORT



PT RESTING COMFORTABLY IN BED. NO S/S OR C/O PAIN OR DISTRESS NOTED. SIDE RAILS UP X2, CALL 
LIGHT LEFT WITHIN REACH. PT KEPT CLEAN, DRY, AND COMFORTABLE. WILL GIVE REPORT TO NOC RN.

## 2023-04-02 NOTE — NUR
MS RN OPENING NOTE



RECEIVED PATIENT IN BED, AWAKE, ALERT AND ORIENTED X 3, WITH FATHER AT BEDSIDE. ABLE TO MAKE 
NEEDS KNOWN. AFEBRILE AND NOT IN ANY FORM OF ACUTE DISTRESS. 

BREATHING EVEN AND NON LABORED. WITH IV ACCESS ON L AC 22G-SL. SAFETY MEASURES IN PLACE. 
KEPT BED IN LOCKED AND IN LOW POSITION. SIDE RAILS UP X2. ADVISED TO USE THE CALL LIGHT WHEN 
 IN NEED OF ASSISTANCE.

## 2023-04-03 VITALS — DIASTOLIC BLOOD PRESSURE: 94 MMHG | SYSTOLIC BLOOD PRESSURE: 149 MMHG

## 2023-04-03 LAB
BUN SERPL-MCNC: 8 MG/DL (ref 7–18)
CALCIUM SERPL-MCNC: 8.9 MG/DL (ref 8.5–10.1)
CHLORIDE SERPL-SCNC: 106 MMOL/L (ref 98–107)
CO2 SERPL-SCNC: 27 MMOL/L (ref 21–32)
CREAT SERPL-MCNC: 0.7 MG/DL (ref 0.6–1.3)
GLUCOSE SERPL-MCNC: 89 MG/DL (ref 74–106)
POTASSIUM SERPL-SCNC: 3.4 MMOL/L (ref 3.5–5.1)
SODIUM SERPL-SCNC: 140 MMOL/L (ref 136–145)

## 2023-04-03 RX ADMIN — ALBUTEROL SULFATE SCH MG: 2.5 SOLUTION RESPIRATORY (INHALATION) at 07:41

## 2023-04-03 RX ADMIN — ALBUTEROL SULFATE SCH MG: 2.5 SOLUTION RESPIRATORY (INHALATION) at 12:00

## 2023-04-03 RX ADMIN — PANTOPRAZOLE SODIUM SCH MG: 40 TABLET, DELAYED RELEASE ORAL at 09:05

## 2023-04-03 RX ADMIN — OXYCODONE HYDROCHLORIDE AND ACETAMINOPHEN SCH MG: 500 TABLET ORAL at 16:27

## 2023-04-03 RX ADMIN — Medication SCH MG: at 07:41

## 2023-04-03 RX ADMIN — CHLORHEXIDINE GLUCONATE 0.12% ORAL RINSE SCH ML: 1.2 LIQUID ORAL at 09:04

## 2023-04-03 RX ADMIN — PENTOXIFYLLINE SCH MG: 400 TABLET, FILM COATED, EXTENDED RELEASE ORAL at 09:00

## 2023-04-03 RX ADMIN — MUPIROCIN CALCIUM SCH APPLIC: 20 CREAM TOPICAL at 09:00

## 2023-04-03 RX ADMIN — CEFEPIME HYDROCHLORIDE SCH MLS/HR: 1 INJECTION, POWDER, FOR SOLUTION INTRAMUSCULAR; INTRAVENOUS at 13:01

## 2023-04-03 RX ADMIN — Medication SCH MG: at 12:00

## 2023-04-03 RX ADMIN — OLANZAPINE SCH MG: 5 TABLET, FILM COATED ORAL at 09:04

## 2023-04-03 RX ADMIN — SODIUM CHLORIDE SCH MLS/HR: 9 INJECTION, SOLUTION INTRAVENOUS at 09:20

## 2023-04-03 RX ADMIN — MUPIROCIN SCH APPLIC: 20 OINTMENT TOPICAL at 09:43

## 2023-04-03 RX ADMIN — LEVOTHYROXINE SODIUM SCH MCG: 75 TABLET ORAL at 09:04

## 2023-04-03 RX ADMIN — ENOXAPARIN SODIUM SCH MG: 40 INJECTION SUBCUTANEOUS at 09:44

## 2023-04-03 RX ADMIN — DEXTROSE MONOHYDRATE SCH MLS/HR: 50 INJECTION, SOLUTION INTRAVENOUS at 09:09

## 2023-04-03 RX ADMIN — OXYCODONE HYDROCHLORIDE AND ACETAMINOPHEN SCH MG: 500 TABLET ORAL at 09:05

## 2023-04-03 RX ADMIN — PENTOXIFYLLINE SCH MG: 400 TABLET, FILM COATED, EXTENDED RELEASE ORAL at 16:27

## 2023-04-03 RX ADMIN — QUETIAPINE SCH MG: 25 TABLET, FILM COATED ORAL at 09:05

## 2023-04-03 RX ADMIN — CEFEPIME HYDROCHLORIDE SCH MLS/HR: 1 INJECTION, POWDER, FOR SOLUTION INTRAMUSCULAR; INTRAVENOUS at 04:20

## 2023-04-03 RX ADMIN — DEXTROSE MONOHYDRATE SCH MLS/HR: 50 INJECTION, SOLUTION INTRAVENOUS at 00:17

## 2023-04-03 RX ADMIN — PENTOXIFYLLINE SCH MG: 400 TABLET, FILM COATED, EXTENDED RELEASE ORAL at 13:49

## 2023-04-03 RX ADMIN — CITALOPRAM SCH MG: 20 TABLET ORAL at 09:13

## 2023-04-03 RX ADMIN — QUETIAPINE SCH MG: 25 TABLET, FILM COATED ORAL at 16:26

## 2023-04-03 NOTE — NUR
MS RN OPENING NOTES:



RECEIVED PATIENT SITTING IN BED, WITH FATHER AT BEDSIDE. NO SIGNS OF DISTRESS, NO COMPLAIN 
OF PAIN AND DISCOMFORT AT THIS TIME. PATIENT IS A/O X3. ON ROOM AIR SATURATING WELL; NO SOB 
NOTED, BREATHING EVEN AND UNLABORED. WITH IV ACCESS ON LEFT AC 22G RUNNING WITH NS @ 
75ML/HR, INFUSING WELL. SAFETY MEASURE IN PLACE. BED IN LOW AND LOCKED POSITION, SIDE RAILS 
UP X2; CALL LIGHT WITHIN EASY REACH. WILL CONTINUE TO MONITOR PATIENT.

## 2023-04-03 NOTE — NUR
MS RN



MS RN



MISTAKENLY PULLED OUT 1 TABLET INSTEAD OF 2 OF CITALOPRAM TAB. WENT TO [employer] GET ANOTHER 
1 TO COMPLETE THE DOSE.

## 2023-04-03 NOTE — NUR
MS RN CLOSING NOTE



PATIENT IN BED, ASLEEP BUT EASY TO AROUSE AND RESPONSIVE, WITH FATHER AT BEDSIDE. ABLE TO 
MAKE NEEDS KNOWN. AFEBRILE AND NOT IN ANY FORM OF ACUTE DISTRESS. 

BREATHING EVEN AND NON LABORED. WITH IV ACCESS ON L AC 22G RUNNING WITH NS AT 75ML/HR. 
MEDICATED AS ORDERED. CONTINUOUS ON IV ATB, MONITORED FOR ANY ADVERSE REACTION. WOUND CARE 
DONE DURING THE SHIFT. SAFETY MEASURES IN PLACE. KEPT BED IN LOCKED AND IN LOW POSITION. 
SIDE RAILS UP X2. ADVISED TO USE THE CALL LIGHT WHEN  IN NEED OF ASSISTANCE. ALL NURSING 
NEEDS ATTENDED. ENDORSED TO INCOMING SHIFT FOR CONTINUITY OF CARE.

## 2023-04-03 NOTE — NUR
MS DISCHARGE NOTES:



DISCHARGED PATIENT TO [facility] IN STABLE CONDITION. A/O X3. ON ROOM AIR, 
TOLERATING WELL. NO S/SX OF DISTRESS, BREATHING WITHOUT DIFFICULTY. DENIES PAIN AND 
DISCOMFORT AT THIS TIME. ALL BELONGINGS ACCOUNTED TO PATIENT. CARE INSTRUCTION RENDERED TO 
PATIENTS CAREGIVER/MOTHER (CONSERVATOR). DUE MEDS GIVEN. IV ACCESS REMOVED, NO REDNESS OR 
BLEEDING NOTED. WOUND DRESSING CHANGED PRIOR TO DISCHARGE. DISCHARGED INSTRUCTION RELAYED TO 
PATIENTS CAREGIVER/MOTHER (CONSERVATOR) AS WELL AS TO [facility] FACILITY NURSE. 
PATIENT LEFT THE UNIT VIA WHEELCHAIR.  [facility]SE DAVIS. ENDORSED ACCORDINGLY.

## 2023-09-10 NOTE — NUR
TELERN

REPORT GIVEN TO RN FOR CONTINUITY OF CARE.  REMAINS SR ON THE MONITOR, Abdomen soft, non-tender, no rebound, no guarding.